# Patient Record
Sex: FEMALE | Race: WHITE | NOT HISPANIC OR LATINO | Employment: UNEMPLOYED | URBAN - METROPOLITAN AREA
[De-identification: names, ages, dates, MRNs, and addresses within clinical notes are randomized per-mention and may not be internally consistent; named-entity substitution may affect disease eponyms.]

---

## 2019-02-19 ENCOUNTER — OFFICE VISIT (OUTPATIENT)
Dept: URGENT CARE | Facility: CLINIC | Age: 49
End: 2019-02-19
Payer: COMMERCIAL

## 2019-02-19 VITALS
DIASTOLIC BLOOD PRESSURE: 66 MMHG | WEIGHT: 270 LBS | HEIGHT: 68 IN | HEART RATE: 87 BPM | SYSTOLIC BLOOD PRESSURE: 146 MMHG | TEMPERATURE: 102 F | RESPIRATION RATE: 19 BRPM | BODY MASS INDEX: 40.92 KG/M2 | OXYGEN SATURATION: 99 %

## 2019-02-19 DIAGNOSIS — J06.9 VIRAL URI: Primary | ICD-10-CM

## 2019-02-19 PROCEDURE — 99203 OFFICE O/P NEW LOW 30 MIN: CPT | Performed by: PHYSICIAN ASSISTANT

## 2019-02-19 RX ORDER — FLUTICASONE PROPIONATE 50 MCG
2 SPRAY, SUSPENSION (ML) NASAL DAILY
Qty: 16 G | Refills: 0 | Status: SHIPPED | OUTPATIENT
Start: 2019-02-19

## 2019-02-19 RX ORDER — BENZONATATE 200 MG/1
200 CAPSULE ORAL 3 TIMES DAILY PRN
Qty: 20 CAPSULE | Refills: 0 | Status: SHIPPED | OUTPATIENT
Start: 2019-02-19

## 2019-02-19 RX ORDER — LAMOTRIGINE 200 MG/1
25 TABLET ORAL DAILY
COMMUNITY

## 2019-02-19 NOTE — PROGRESS NOTES
[facility]        NAME: Pretty Cervantes is a 46 y o  female  : 1970    MRN: [id number]  DATE: 2019  TIME: 11:26 AM    Assessment and Plan   Viral URI [J06 9]  1  Viral URI  benzonatate (TESSALON) 200 MG capsule    fluticasone (FLONASE) 50 mcg/act nasal spray         Patient Instructions     Discussed condition with pt  I suspect acute viral URI which I will treat with hydration, rest, discussed OTC cold meds, will prescribe her Flonase and Tessalon Perles, and observation  Follow up with PCP in 3-5 days  Proceed to  ER if symptoms worsen  Chief Complaint     Chief Complaint   Patient presents with    Fever     Pt reports of fever with sinus congestion and headache with URI s/s          History of Present Illness       Pt presents with onset 2 days ago of HA, nasal congestion, ST, PND, productive cough, chest congestion, fever up to 102 F, chills, body aches  Denies N/V/D  She has taken OTC cold and flu liquid along with Advil every 4 hours  Denies hx of asthma/allergies  Does not smoke (quit 3 months ago)  She has not received the flu shot  Review of Systems   Review of Systems   Constitutional: Positive for chills and fever  HENT: Positive for congestion, postnasal drip and sore throat  Respiratory: Positive for cough  Cardiovascular: Negative  Gastrointestinal: Negative  Genitourinary: Negative  Musculoskeletal: Positive for myalgias           Current Medications       Current Outpatient Medications:     lamoTRIgine (LaMICtal) 200 MG tablet, Take 25 mg by mouth daily, Disp: , Rfl:     lurasidone (LATUDA) 120 mg tablet, Take 20 mg by mouth daily with breakfast, Disp: , Rfl:     PANTOPRAZOLE SODIUM PO, Take 40 mg by mouth, Disp: , Rfl:     benzonatate (TESSALON) 200 MG capsule, Take 1 capsule (200 mg total) by mouth 3 (three) times a day as needed for cough, Disp: 20 capsule, Rfl: 0    fluticasone (FLONASE) 50 mcg/act nasal spray, 2 sprays into each nostril daily, Disp: 16 g, Rfl: 0    Current Allergies     Allergies as of 02/19/2019    (No Known Allergies)            The following portions of the patient's history were reviewed and updated as appropriate: allergies, current medications, past family history, past medical history, past social history, past surgical history and problem list      Past Medical History:   Diagnosis Date    Depression        Past Surgical History:   Procedure Laterality Date    CARDIAC CATHETERIZATION      CHOLECYSTECTOMY      DILATION AND CURETTAGE OF UTERUS      GASTRIC BYPASS         History reviewed  No pertinent family history  Medications have been verified  Objective   /66   Pulse 87   Temp (!) 102 °F (38 9 °C)   Resp 19   Ht 5' 8" (1 727 m)   Wt 122 kg (270 lb)   SpO2 99%   BMI 41 05 kg/m²        Physical Exam     Physical Exam   Constitutional: She is oriented to person, place, and time  She appears well-developed and well-nourished  No distress  HENT:   Right Ear: Hearing, tympanic membrane, external ear and ear canal normal    Left Ear: Hearing, tympanic membrane, external ear and ear canal normal    Nose: Mucosal edema (B/L boggy turbinates) present  Mouth/Throat: Mucous membranes are normal  Posterior oropharyngeal erythema (PND) present  No oropharyngeal exudate  Neck: Neck supple  Cardiovascular: Normal rate, regular rhythm and normal heart sounds  Pulmonary/Chest: Effort normal and breath sounds normal    Lymphadenopathy:     She has no cervical adenopathy  Neurological: She is alert and oriented to person, place, and time  Psychiatric: She has a normal mood and affect  Vitals reviewed

## 2019-02-19 NOTE — PATIENT INSTRUCTIONS

## 2025-05-26 ENCOUNTER — HOSPITAL ENCOUNTER (INPATIENT)
Facility: HOSPITAL | Age: 55
LOS: 1 days | Discharge: HOME | DRG: 641 | End: 2025-05-26
Attending: INTERNAL MEDICINE | Admitting: INTERNAL MEDICINE
Payer: COMMERCIAL

## 2025-05-26 ENCOUNTER — APPOINTMENT (OUTPATIENT)
Dept: RADIOLOGY | Facility: HOSPITAL | Age: 55
DRG: 641 | End: 2025-05-26
Payer: COMMERCIAL

## 2025-05-26 ENCOUNTER — APPOINTMENT (EMERGENCY)
Dept: CT IMAGING | Facility: HOSPITAL | Age: 55
DRG: 641 | End: 2025-05-26
Payer: COMMERCIAL

## 2025-05-26 VITALS
WEIGHT: 190.26 LBS | SYSTOLIC BLOOD PRESSURE: 107 MMHG | HEART RATE: 69 BPM | TEMPERATURE: 97.6 F | DIASTOLIC BLOOD PRESSURE: 56 MMHG | OXYGEN SATURATION: 95 % | RESPIRATION RATE: 16 BRPM

## 2025-05-26 DIAGNOSIS — F10.90 ALCOHOL USE: ICD-10-CM
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
## 2025-05-26 DIAGNOSIS — M54.50 ACUTE MIDLINE LOW BACK PAIN WITHOUT SCIATICA: ICD-10-CM

## 2025-05-26 DIAGNOSIS — R40.2422 GLASGOW COMA SCALE TOTAL SCORE 9-12, AT ARRIVAL TO EMERGENCY DEPARTMENT: ICD-10-CM

## 2025-05-26 DIAGNOSIS — R94.31 PROLONGED QT INTERVAL: ICD-10-CM

## 2025-05-26 PROBLEM — E87.29 ALCOHOLIC KETOACIDOSIS: Status: ACTIVE | Noted: 2025-05-26

## 2025-05-26 PROBLEM — E87.6 HYPOKALEMIA: Status: ACTIVE | Noted: 2025-05-26

## 2025-05-26 LAB
ABO GROUP BLD: NORMAL
ABO GROUP BLD: NORMAL
ALBUMIN SERPL BCG-MCNC: 4 G/DL (ref 3.5–5)
ALP SERPL-CCNC: 95 U/L (ref 34–104)
ALT SERPL W P-5'-P-CCNC: 23 U/L (ref 7–52)
ANION GAP SERPL CALCULATED.3IONS-SCNC: 14 MMOL/L (ref 4–13)
ANION GAP SERPL CALCULATED.3IONS-SCNC: 5 MMOL/L (ref 4–13)
APTT PPP: 26 SECONDS (ref 23–34)
AST SERPL W P-5'-P-CCNC: 16 U/L (ref 13–39)
ATRIAL RATE: 66 BPM
BASE EX.OXY STD BLDV CALC-SCNC: 91 % (ref 60–80)
BASE EXCESS BLDA CALC-SCNC: -5 MMOL/L (ref -2–3)
BASE EXCESS BLDV CALC-SCNC: -1.9 MMOL/L
BASOPHILS # BLD AUTO: 0.07 THOUSANDS/ÂΜL (ref 0–0.1)
BASOPHILS NFR BLD AUTO: 1 % (ref 0–1)
BILIRUB SERPL-MCNC: 0.28 MG/DL (ref 0.2–1)
BLD GP AB SCN SERPL QL: NEGATIVE
BUN SERPL-MCNC: 10 MG/DL (ref 5–25)
BUN SERPL-MCNC: 13 MG/DL (ref 5–25)
CA-I BLD-SCNC: 1.12 MMOL/L (ref 1.12–1.32)
CALCIUM SERPL-MCNC: 7.8 MG/DL (ref 8.4–10.2)
CALCIUM SERPL-MCNC: 8.3 MG/DL (ref 8.4–10.2)
CHLORIDE SERPL-SCNC: 113 MMOL/L (ref 96–108)
CHLORIDE SERPL-SCNC: 99 MMOL/L (ref 96–108)
CK SERPL-CCNC: 78 U/L (ref 26–192)
CO2 SERPL-SCNC: 25 MMOL/L (ref 21–32)
CO2 SERPL-SCNC: 26 MMOL/L (ref 21–32)
CREAT SERPL-MCNC: 0.56 MG/DL (ref 0.6–1.3)
CREAT SERPL-MCNC: 0.64 MG/DL (ref 0.6–1.3)
EOSINOPHIL # BLD AUTO: 0.25 THOUSAND/ÂΜL (ref 0–0.61)
EOSINOPHIL NFR BLD AUTO: 3 % (ref 0–6)
ERYTHROCYTE [DISTWIDTH] IN BLOOD BY AUTOMATED COUNT: 14.6 % (ref 11.6–15.1)
ETHANOL SERPL-MCNC: 203 MG/DL
GFR SERPL CREATININE-BSD FRML MDRD: 103 ML/MIN/1.73SQ M
GFR SERPL CREATININE-BSD FRML MDRD: 99 ML/MIN/1.73SQ M
GLUCOSE SERPL-MCNC: 101 MG/DL (ref 65–140)
GLUCOSE SERPL-MCNC: 79 MG/DL (ref 65–140)
GLUCOSE SERPL-MCNC: 98 MG/DL (ref 65–140)
HCO3 BLDA-SCNC: 21.5 MMOL/L (ref 24–30)
HCO3 BLDV-SCNC: 23.1 MMOL/L (ref 24–30)
HCT VFR BLD AUTO: 45.1 % (ref 34.8–46.1)
HCT VFR BLD CALC: 45 % (ref 34.8–46.1)
HGB BLD-MCNC: 15 G/DL (ref 11.5–15.4)
HGB BLDA-MCNC: 15.3 G/DL (ref 11.5–15.4)
IMM GRANULOCYTES # BLD AUTO: 0.08 THOUSAND/UL (ref 0–0.2)
IMM GRANULOCYTES NFR BLD AUTO: 1 % (ref 0–2)
INR PPP: 0.88 (ref 0.85–1.19)
LACTATE SERPL-SCNC: 1.7 MMOL/L (ref 0.5–2)
LACTATE SERPL-SCNC: 2.6 MMOL/L (ref 0.5–2)
LYMPHOCYTES # BLD AUTO: 3.45 THOUSANDS/ÂΜL (ref 0.6–4.47)
LYMPHOCYTES NFR BLD AUTO: 48 % (ref 14–44)
MCH RBC QN AUTO: 31.8 PG (ref 26.8–34.3)
MCHC RBC AUTO-ENTMCNC: 33.3 G/DL (ref 31.4–37.4)
MCV RBC AUTO: 96 FL (ref 82–98)
MONOCYTES # BLD AUTO: 0.54 THOUSAND/ÂΜL (ref 0.17–1.22)
MONOCYTES NFR BLD AUTO: 7 % (ref 4–12)
NEUTROPHILS # BLD AUTO: 2.91 THOUSANDS/ÂΜL (ref 1.85–7.62)
NEUTS SEG NFR BLD AUTO: 40 % (ref 43–75)
NRBC BLD AUTO-RTO: 0 /100 WBCS
O2 CT BLDV-SCNC: 17.9 ML/DL
P AXIS: 83 DEGREES
PCO2 BLD: 23 MMOL/L (ref 21–32)
PCO2 BLD: 44.2 MM HG (ref 42–50)
PCO2 BLDV: 40.2 MM HG (ref 42–50)
PH BLD: 7.29 [PH] (ref 7.3–7.4)
PH BLDV: 7.38 [PH] (ref 7.3–7.4)
PLATELET # BLD AUTO: 204 THOUSANDS/UL (ref 149–390)
PMV BLD AUTO: 10 FL (ref 8.9–12.7)
PO2 BLD: 52 MM HG (ref 35–45)
PO2 BLDV: 63.7 MM HG (ref 35–45)
POTASSIUM BLD-SCNC: 2.9 MMOL/L (ref 3.5–5.3)
POTASSIUM SERPL-SCNC: 2.9 MMOL/L (ref 3.5–5.3)
POTASSIUM SERPL-SCNC: 3.4 MMOL/L (ref 3.5–5.3)
PR INTERVAL: 156 MS
PROT SERPL-MCNC: 5.7 G/DL (ref 6.4–8.4)
PROTHROMBIN TIME: 12.7 SECONDS (ref 12.3–15)
QRS AXIS: 79 DEGREES
QRSD INTERVAL: 94 MS
QT INTERVAL: 464 MS
QTC INTERVAL: 486 MS
RBC # BLD AUTO: 4.71 MILLION/UL (ref 3.81–5.12)
RH BLD: NEGATIVE
RH BLD: NEGATIVE
SAO2 % BLD FROM PO2: 83 % (ref 60–85)
SODIUM BLD-SCNC: 137 MMOL/L (ref 136–145)
SODIUM SERPL-SCNC: 138 MMOL/L (ref 135–147)
SODIUM SERPL-SCNC: 144 MMOL/L (ref 135–147)
SPECIMEN EXPIRATION DATE: NORMAL
SPECIMEN SOURCE: ABNORMAL
T WAVE AXIS: 79 DEGREES
VENTRICULAR RATE: 66 BPM
WBC # BLD AUTO: 7.3 THOUSAND/UL (ref 4.31–10.16)

## 2025-05-26 PROCEDURE — 83605 ASSAY OF LACTIC ACID: CPT | Performed by: SURGERY

## 2025-05-26 PROCEDURE — 70496 CT ANGIOGRAPHY HEAD: CPT

## 2025-05-26 PROCEDURE — 82803 BLOOD GASES ANY COMBINATION: CPT

## 2025-05-26 PROCEDURE — 85014 HEMATOCRIT: CPT

## 2025-05-26 PROCEDURE — 96365 THER/PROPH/DIAG IV INF INIT: CPT

## 2025-05-26 PROCEDURE — 82947 ASSAY GLUCOSE BLOOD QUANT: CPT

## 2025-05-26 PROCEDURE — 82077 ASSAY SPEC XCP UR&BREATH IA: CPT | Performed by: SURGERY

## 2025-05-26 PROCEDURE — 76705 ECHO EXAM OF ABDOMEN: CPT | Performed by: SURGERY

## 2025-05-26 PROCEDURE — 99285 EMERGENCY DEPT VISIT HI MDM: CPT

## 2025-05-26 PROCEDURE — 80053 COMPREHEN METABOLIC PANEL: CPT | Performed by: SURGERY

## 2025-05-26 PROCEDURE — 85025 COMPLETE CBC W/AUTO DIFF WBC: CPT | Performed by: SURGERY

## 2025-05-26 PROCEDURE — 80048 BASIC METABOLIC PNL TOTAL CA: CPT | Performed by: PHYSICIAN ASSISTANT

## 2025-05-26 PROCEDURE — 96368 THER/DIAG CONCURRENT INF: CPT

## 2025-05-26 PROCEDURE — 70498 CT ANGIOGRAPHY NECK: CPT

## 2025-05-26 PROCEDURE — 36415 COLL VENOUS BLD VENIPUNCTURE: CPT | Performed by: SURGERY

## 2025-05-26 PROCEDURE — 86900 BLOOD TYPING SEROLOGIC ABO: CPT | Performed by: SURGERY

## 2025-05-26 PROCEDURE — 84295 ASSAY OF SERUM SODIUM: CPT

## 2025-05-26 PROCEDURE — 96366 THER/PROPH/DIAG IV INF ADDON: CPT

## 2025-05-26 PROCEDURE — 85610 PROTHROMBIN TIME: CPT | Performed by: SURGERY

## 2025-05-26 PROCEDURE — 96375 TX/PRO/DX INJ NEW DRUG ADDON: CPT

## 2025-05-26 PROCEDURE — 86901 BLOOD TYPING SEROLOGIC RH(D): CPT | Performed by: SURGERY

## 2025-05-26 PROCEDURE — 82550 ASSAY OF CK (CPK): CPT | Performed by: SURGERY

## 2025-05-26 PROCEDURE — 82805 BLOOD GASES W/O2 SATURATION: CPT | Performed by: PHYSICIAN ASSISTANT

## 2025-05-26 PROCEDURE — 93308 TTE F-UP OR LMTD: CPT | Performed by: SURGERY

## 2025-05-26 PROCEDURE — 99223 1ST HOSP IP/OBS HIGH 75: CPT | Performed by: SURGERY

## 2025-05-26 PROCEDURE — 85730 THROMBOPLASTIN TIME PARTIAL: CPT | Performed by: SURGERY

## 2025-05-26 PROCEDURE — 71260 CT THORAX DX C+: CPT

## 2025-05-26 PROCEDURE — 86850 RBC ANTIBODY SCREEN: CPT | Performed by: SURGERY

## 2025-05-26 PROCEDURE — 84132 ASSAY OF SERUM POTASSIUM: CPT

## 2025-05-26 PROCEDURE — 82330 ASSAY OF CALCIUM: CPT

## 2025-05-26 PROCEDURE — 93010 ELECTROCARDIOGRAM REPORT: CPT | Performed by: INTERNAL MEDICINE

## 2025-05-26 PROCEDURE — 74177 CT ABD & PELVIS W/CONTRAST: CPT

## 2025-05-26 PROCEDURE — 71045 X-RAY EXAM CHEST 1 VIEW: CPT

## 2025-05-26 PROCEDURE — 93005 ELECTROCARDIOGRAM TRACING: CPT

## 2025-05-26 PROCEDURE — EDAIR PR ED AIR: Performed by: EMERGENCY MEDICINE

## 2025-05-26 RX ORDER — LIDOCAINE 50 MG/G
2 PATCH TOPICAL ONCE
Status: DISCONTINUED | OUTPATIENT
Start: 2025-05-26 | End: 2025-05-26 | Stop reason: HOSPADM

## 2025-05-26 RX ORDER — ACETAMINOPHEN 325 MG/1
975 TABLET ORAL ONCE
Status: DISCONTINUED | OUTPATIENT
Start: 2025-05-26 | End: 2025-05-26

## 2025-05-26 RX ORDER — LANOLIN ALCOHOL/MO/W.PET/CERES
100 CREAM (GRAM) TOPICAL DAILY
Status: DISCONTINUED | OUTPATIENT
Start: 2025-05-26 | End: 2025-05-26 | Stop reason: HOSPADM

## 2025-05-26 RX ORDER — FOLIC ACID 1 MG/1
1 TABLET ORAL DAILY
Status: DISCONTINUED | OUTPATIENT
Start: 2025-05-26 | End: 2025-05-26 | Stop reason: HOSPADM

## 2025-05-26 RX ORDER — ACETAMINOPHEN 325 MG/1
975 TABLET ORAL EVERY 8 HOURS
Status: DISCONTINUED | OUTPATIENT
Start: 2025-05-26 | End: 2025-05-26 | Stop reason: HOSPADM

## 2025-05-26 RX ORDER — MAGNESIUM SULFATE HEPTAHYDRATE 40 MG/ML
2 INJECTION, SOLUTION INTRAVENOUS ONCE
Status: COMPLETED | OUTPATIENT
Start: 2025-05-26 | End: 2025-05-26

## 2025-05-26 RX ORDER — ACETAMINOPHEN 10 MG/ML
1000 INJECTION, SOLUTION INTRAVENOUS ONCE
Status: COMPLETED | OUTPATIENT
Start: 2025-05-26 | End: 2025-05-26

## 2025-05-26 RX ORDER — ACETAMINOPHEN 325 MG/1
650 TABLET ORAL EVERY 6 HOURS PRN
Start: 2025-05-26

## 2025-05-26 RX ORDER — POTASSIUM CHLORIDE 14.9 MG/ML
20 INJECTION INTRAVENOUS ONCE
Status: COMPLETED | OUTPATIENT
Start: 2025-05-26 | End: 2025-05-26

## 2025-05-26 RX ADMIN — Medication 100 MG: at 09:41

## 2025-05-26 RX ADMIN — SODIUM CHLORIDE, SODIUM LACTATE, POTASSIUM CHLORIDE, AND CALCIUM CHLORIDE 1000 ML: .6; .31; .03; .02 INJECTION, SOLUTION INTRAVENOUS at 08:05

## 2025-05-26 RX ADMIN — MULTIPLE VITAMINS W/ MINERALS TAB 1 TABLET: TAB ORAL at 09:41

## 2025-05-26 RX ADMIN — FOLIC ACID 1 MG: 1 TABLET ORAL at 09:41

## 2025-05-26 RX ADMIN — ACETAMINOPHEN 1000 MG: 1000 INJECTION, SOLUTION INTRAVENOUS at 07:01

## 2025-05-26 RX ADMIN — IOHEXOL 100 ML: 350 INJECTION, SOLUTION INTRAVENOUS at 05:21

## 2025-05-26 RX ADMIN — POTASSIUM CHLORIDE 20 MEQ: 14.9 INJECTION, SOLUTION INTRAVENOUS at 07:01

## 2025-05-26 RX ADMIN — LIDOCAINE 2 PATCH: 700 PATCH TOPICAL at 07:01

## 2025-05-26 RX ADMIN — MAGNESIUM SULFATE HEPTAHYDRATE 2 G: 40 INJECTION, SOLUTION INTRAVENOUS at 07:01

## 2025-05-26 RX ADMIN — SODIUM CHLORIDE 1000 ML: 0.9 INJECTION, SOLUTION INTRAVENOUS at 07:02

## 2025-05-26 RX ADMIN — ACETAMINOPHEN 975 MG: 325 TABLET ORAL at 14:02

## 2025-05-26 NOTE — H&P
H&P - Trauma   Name: Alethea Trevino 55 y.o. female I MRN: [id number]  Unit/Bed#: ED-23 I Date of Admission: 5/26/2025   Date of Service: 5/26/2025 I Hospital Day: 0     Assessment & Plan  Fall, initial encounter  -No acute findings on imaging  -PT/OT  -Multimodal pain management.   Dresden coma scale total score 9-12, at arrival to emergency department  -CTH, neg  -CTA H/N neg  -Patient GCS 15 on reevaluation in the room and clinical clearance of C-spine.   -ETOH 200s, possible alcoholic ketoacidosis  -Replete electrolytes  -Trend lactic  -IVF bolus    Acute midline low back pain without sciatica  -No acute findings on CTAP  -Multimodal pain management  -PT/OT    Prolonged QT interval  -Hx of poor diet  -Hx of alcohol use  -Replete electrolytes and repeat EKG  Alcohol use  -Hx of gastric bypass  -Hx of poor diet  -Thiamine, folate, multivitamin  -CIWA precautions.     Trauma Alert: Level A   Model of Arrival: Ambulance    Trauma Team: Attending Dr. Beaver and Residents Dr. Plascencia  Consultants:     None     History of Present Illness   Chief Complaint: Fall w/ AMS  Mechanism:Fall     Alethea Trevino is a 55 y.o. female who presents Fall.    Per EMS patient was going up 1 step and fell backwards onto her buttocks and then from completed the fall and hit her head.  Unknown LOC.  No blood thinner use.  Possible alcohol use.  GCS 12 per EMS.  Patient not answering questions in the emergency department following commands and endorses back pain.      Supporting history from daughter-in-law, Lexus Haynes [phone].  Patient is visiting from [place].  She has a previous history of gastric bypass and possibly bipolar disorder.  She has noted that she has been eating less than normal, will eat only yogurt and cottage cheese in approximately twice a day.  History of gastric bypass.  Has been drinking EtOH frequently.    Review of Systems   All other systems reviewed and are negative.    Medical History Review: I have  reviewed the patient's PMH, PSH, Social History, Family History, Meds, and Allergies   Patient has no allergy information on record.    There is no immunization history on file for this patient.  Last Tetanus: unknown         Objective :  Temp:  [97.6 °F (36.4 °C)] 97.6 °F (36.4 °C)  HR:  [63-69] 64  BP: ()/(48-59) 90/53  Resp:  [14-18] 14  SpO2:  [96 %-100 %] 99 %  O2 Device: Nasal cannula    Initial Vitals:   Temperature: 97.6 °F (36.4 °C) (05/26/25 0505)  Pulse: 64 (05/26/25 0505)  Respirations: 16 (05/26/25 0505)  Blood Pressure: 106/59 (05/26/25 0505)    Primary Survey:   Airway:        Status: patent;        Pre-hospital Interventions: none        Hospital Interventions: none  Breathing:        Pre-hospital Interventions: none       Effort: normal       Right breath sounds: normal       Left breath sounds: normal  Circulation:        Rhythm: regular no murmur       Rate: regular   Right Pulses Left Pulses    R radial: 2+  R femoral: 2+  R pedal: 2+  R carotid: 2+   L radial: 2+  L femoral: 2+  L pedal: 2+  L carotid: 2+     Disability:        GCS: Eye: 3; Verbal: 1 Motor: 6 Total: 10       Right Pupil: 4 mm;  round;  reactive         Left Pupil:  4 mm;  round;  reactive      R Motor Strength L Motor Strength    R : 4/5  R dorsiflex: 4/5  R plantarflex: 4/5 L : 4/5  L dorsiflex: 4/5  L plantarflex: 4/5        Sensory:  No sensory deficit  Exposure:       Completed: Yes      Secondary Survey:  Physical Exam  Vitals reviewed.   Constitutional:       General: She is in acute distress.      Appearance: Normal appearance. She is obese. She is not ill-appearing, toxic-appearing or diaphoretic.   HENT:      Head: Normocephalic and atraumatic.      Right Ear: External ear normal.      Left Ear: External ear normal.      Nose: Nose normal.     Eyes:      Extraocular Movements: Extraocular movements intact.      Pupils: Pupils are equal, round, and reactive to light.     Neck:      Comments: Midline T and  L-spine tenderness without step-off or deformity.  Cardiovascular:      Rate and Rhythm: Normal rate and regular rhythm.      Pulses: Normal pulses.      Heart sounds: Normal heart sounds. No murmur heard.  Pulmonary:      Effort: Pulmonary effort is normal. No respiratory distress.      Breath sounds: Normal breath sounds. No stridor. No wheezing, rhonchi or rales.   Chest:      Chest wall: No tenderness.   Abdominal:      General: There is no distension.      Palpations: Abdomen is soft. There is no mass.      Tenderness: There is no abdominal tenderness. There is no guarding or rebound.      Hernia: No hernia is present.     Musculoskeletal:         General: No swelling, tenderness, deformity or signs of injury.      Cervical back: Neck supple. No rigidity or tenderness.      Right lower leg: Edema present.      Left lower leg: Edema present.     Skin:     General: Skin is warm and dry.      Capillary Refill: Capillary refill takes less than 2 seconds.      Coloration: Skin is not jaundiced or pale.      Findings: No bruising, erythema, lesion or rash.     Neurological:      Mental Status: She is alert.      GCS: GCS eye subscore is 3. GCS verbal subscore is 1. GCS motor subscore is 6.             Lab Results: I have reviewed the following results:  Recent Labs     05/26/25  0508 05/26/25  0510 05/26/25  0802   WBC 7.30  --   --    HGB 15.0 15.3  --    HCT 45.1 45  --      --   --    SODIUM 138  --   --    K 2.9*  --   --    CL 99  --   --    CO2 25 23  --    BUN 13  --   --    CREATININE 0.64  --   --    GLUC 101  --   --    CAIONIZED  --  1.12  --    AST 16  --   --    ALT 23  --   --    ALB 4.0  --   --    TBILI 0.28  --   --    ALKPHOS 95  --   --    PTT 26  --   --    INR 0.88  --   --    LACTICACID 2.6*  --  1.7       Imaging Results: I have personally reviewed pertinent images saved in PACS. CT scan findings (and other pertinent positive findings on images) were discussed with radiology. My  interpretation of the images/reports are as follows:  Chest Xray(s): negative for acute findings   FAST exam(s): negative for acute findings   CT Scan(s): negative for acute findings   Additional Xray(s): N/A     Other Studies: EKG normal sinus rhythm rate of 66, normal TN interval, normal QRS interval, QTc 486, normal axis, nonspecific ST changes with T wave flattening in 3 and aVF, V2, and T wave inversions in V1 and V3, no ischemic changes or STEMI, no delta waves, no epsilon wave, no Brugada pattern, mild QT prolongation.

## 2025-05-26 NOTE — Clinical Note
Case was discussed with Dr. Canada and the patient's admission status was agreed to be Admission Status: inpatient status to the service of Dr. Canada .

## 2025-05-26 NOTE — ED NOTES
Patient continuously asking to be discharged. Asked to not be moved. Provider aware.      Kellie Pérez RN  05/26/25 1343

## 2025-05-26 NOTE — TREATMENT PLAN
Patient seen multiple times throughout the afternoon.  After initially agreeing to admission to the medical service for further resuscitation and workup/treatment as indicated, the patient ultimately decided that she did not want to be admitted and requested to be discharged.  At the time of her initial request, she had additional workup pending and was not deemed medically appropriate for discharge.  We did discuss pursuing additional workup for potential medically appropriate discharge versus signing out AMA, and the patient elected to pursue the additional workup while receiving additional resuscitation.    Her follow-up lab work, ambulation trial, and trial off of supplemental oxygen were completed.  Her repeat lab work demonstrated notable improvement.  She successfully ambulated without difficulty and did not have any new complaints of pain or concerns for undiagnosed injuries at this time.  She remained off of supplemental oxygen for greater than 1 hour with no evidence of hypoxia, shortness of breath or difficulty breathing.    Further discussion was had with the patient as her blood pressure still remained on the lower side, but she was able to maintain a MAP greater than 65 without ongoing IV fluid hydration and did not have any symptoms of hypotension.    I reviewed the additional workup with the internal medicine service and together we agreed that the patient would not need to sign out AMA, but that she is appropriate for discharge per her request.    Strict return precautions were discussed with the patient and provided to her on discharge.

## 2025-05-26 NOTE — PROCEDURES
POC FAST US    Date/Time: 5/26/2025 5:36 AM    Performed by: Verona Plascencia MD  Authorized by: Verona Plascencia MD    Patient location:  ED and Trauma  Other Assisting Provider: No    Procedure details:     Exam Type:  Diagnostic    Indications comment:  Fall w/ AMS    Assess for:  Intra-abdominal fluid and pericardial effusion    Technique: FAST      Views obtained:  Heart - Pericardial sac, RUQ - Ayoub's Pouch, LUQ - Splenorenal space and Suprapubic - Pouch of Keith    Image quality: diagnostic      Image availability:  Images available in PACS  FAST Findings:     RUQ (Hepatorenal) free fluid: absent      LUQ (Splenorenal) free fluid: absent      Suprapubic free fluid: absent      Cardiac wall motion: identified      Pericardial effusion: absent    Interpretation:     Impressions: negative

## 2025-05-26 NOTE — QUICK NOTE
Spoke with patient and explained to her that she will be admitted to the hospital for her acute alcoholic ketoacidosis. She declined admission at the time and stated that she had a few days left with her grandkids and she would not spend today in the hospital. Patient was A&Ox4 and competent to make that decision. Spoke with the ED provider and recommended to repeat blood tests (specifically potassium and VBG) before considering her for discharge vs AMA. SLIM attending and ED provider agreed with this decision-making. Will not be admitting at current time.

## 2025-05-26 NOTE — UTILIZATION REVIEW
Initial Clinical Review    Admission: Date/Time/Statement:   Admission Orders (From admission, onward)       Ordered        05/26/25 0900  INPATIENT ADMISSION  Once                          Orders Placed This Encounter   Procedures    INPATIENT ADMISSION     Standing Status:   Standing     Number of Occurrences:   1     Level of Care:   Level 2 Stepdown / HOT [14]     Estimated length of stay:   Not Applicable     ED Arrival Information       Expected   -    Arrival   5/26/2025 05:00    Acuity   Emergent              Means of arrival   Ambulance    Escorted by   [facility]    Service   Hospitalist    Admission type   Emergency              Arrival complaint   EMS_ Trauma             Chief Complaint   Patient presents with    Trauma     Fall down 1 step +HS +ETOH. GCS 12 for medics trauma A       Initial Presentation: 55 y.o. female with hx gastric bypass and possibly bipolar disorder  who presents to ED via EMS from family member's home with fall- was going up step and fell backwards onto her buttocks and head strike .  Unknown LOC. No blood thinner use. Possible alcohol use. GCS 12 per EMS. Per dgt in law ,she has been eating less than normal, will eat only yogurt and cottage cheese in approximately twice a day. On exam, GCS 10 , 4/5 motor strength Bilat  and plantar flexion . Midline T and L-spine tenderness . Re eval GCS 15  . Labs: ETOH 200's , lactic acid 2.6 . K 2.9 . EKG normal sinus rhythm rate of 66, normal QRS  . CT head, CT Head/ neck neg . Pt given IV Tylenol, K repletion, IVF in ED. Pt admitted as Inpatient with fall, midline back pain , alcohol use, possible ketoacidosis .Multimodal pain control. PT/OT . Replete lytes . Trend lactic acid . IVF bolus . CIWA . Thiamine, folate, MVI .    Pt was to be admitted by internal med for acute alcoholic ketoacidosis. She declined admission at the time and stated that she had a few days left with her grandkids and she would not spend today in the  Hospitals in [place]. Patient was A&Ox4 and competent to make that decision. Spoke with the ED provider and recommended to repeat blood tests (specifically potassium and VBG) before considering her for discharge vs AMA.           ED Treatment-Medication Administration from 05/26/2025 0446 to 05/26/2025 1812         Date/Time Order Dose Route Action     05/26/2025 0521 iohexol (OMNIPAQUE) 350 MG/ML injection (MULTI-DOSE) 100 mL 100 mL Intravenous Given     05/26/2025 0701 lidocaine (LIDODERM) 5 % patch 2 patch 2 patch Topical Medication Applied     05/26/2025 0701 acetaminophen (Ofirmev) injection 1,000 mg 1,000 mg Intravenous New Bag     05/26/2025 0701 potassium chloride 20 mEq IVPB (premix) 20 mEq Intravenous New Bag     05/26/2025 0701 magnesium sulfate 2 g/50 mL IVPB (premix) 2 g 2 g Intravenous New Bag     05/26/2025 0702 sodium chloride 0.9 % bolus 1,000 mL 1,000 mL Intravenous New Bag     05/26/2025 0941 thiamine tablet 100 mg 100 mg Oral Given     05/26/2025 0941 folic acid (FOLVITE) tablet 1 mg 1 mg Oral Given     05/26/2025 0941 multivitamin-minerals (CENTRUM) tablet 1 tablet 1 tablet Oral Given     05/26/2025 0805 lactated ringers bolus 1,000 mL 1,000 mL Intravenous New Bag     05/26/2025 1402 acetaminophen (TYLENOL) tablet 975 mg 975 mg Oral Given            Scheduled Medications:  acetaminophen, 975 mg, Oral, Q8H  folic acid, 1 mg, Oral, Daily  lidocaine, 2 patch, Topical, Once  multivitamin-minerals, 1 tablet, Oral, Daily  thiamine, 100 mg, Oral, Daily      Continuous IV Infusions:     PRN Meds:     ED Triage Vitals   Temperature Pulse Respirations Blood Pressure SpO2 Pain Score   05/26/25 0505 05/26/25 0505 05/26/25 0505 05/26/25 0505 05/26/25 0505 05/26/25 1402   97.6 °F (36.4 °C) 64 16 106/59 100 % 8     Weight (last 2 days) before discharge       Date/Time Weight    05/26/25 05:05:14 86.3 (190.26)            Vital Signs (last 3 days) before discharge       Date/Time Temp Pulse Resp BP MAP (mmHg) SpO2  Calculated FIO2 (%) - Nasal Cannula Nasal Cannula O2 Flow Rate (L/min) O2 Device Patient Position - Orthostatic VS Sarah Coma Scale Score CIWA-Ar Total Pain    05/26/25 1545 -- 69 16 107/56 78 95 % -- -- None (Room air) Sitting -- -- --    05/26/25 1530 -- 72 16 108/56 79 96 % -- -- None (Room air) Sitting -- -- --    05/26/25 1515 -- 68 16 93/50 67 97 % -- -- None (Room air) Sitting -- -- --    05/26/25 1500 -- 71 16 97/51 70 95 % -- -- None (Room air) Lying -- -- --    05/26/25 1445 -- 77 16 94/51 69 95 % -- -- None (Room air) Sitting -- -- --    05/26/25 1430 -- 74 16 103/56 75 96 % -- -- None (Room air) Sitting -- -- --    05/26/25 1415 -- 75 16 91/53 67 96 % -- -- None (Room air) Sitting -- -- --    05/26/25 1402 -- -- -- -- -- -- -- -- -- -- -- -- 8    05/26/25 1400 -- 72 16 101/57 76 96 % -- -- None (Room air) Sitting -- -- --    05/26/25 1345 -- 74 16 83/43 60 96 % -- -- None (Room air) Sitting -- -- --    05/26/25 1340 -- 75 16 88/46 63 95 % -- -- None (Room air) Lying -- -- --    05/26/25 1300 -- 75 16 93/55 70 95 % -- -- None (Room air) Lying -- -- --    05/26/25 1245 -- 71 16 97/56 73 98 % -- -- None (Room air) Lying -- -- --    05/26/25 1230 -- 70 16 98/56 73 97 % -- -- None (Room air) Lying -- -- --    05/26/25 1215 -- 70 16 99/57 75 95 % -- -- None (Room air) Lying -- -- --    05/26/25 1200 -- 69 16 102/57 76 96 % -- -- None (Room air) Lying -- -- --    05/26/25 1145 -- 69 14 94/56 67 96 % 28 2 L/min Nasal cannula Lying -- -- --    05/26/25 1130 -- 68 14 94/56 70 96 % 28 2 L/min Nasal cannula Lying -- 2 --    05/26/25 1100 -- 72 14 98/56 74 96 % 28 2 L/min Nasal cannula Sitting -- -- --    05/26/25 1045 -- 73 14 96/52 67 97 % 28 2 L/min Nasal cannula Sitting -- -- --    05/26/25 1030 -- 78 14 93/55 68 96 % 28 2 L/min Nasal cannula -- -- -- --    05/26/25 1000 -- 70 14 101/56 75 98 % -- -- -- -- -- -- --    05/26/25 0945 -- 66 16 99/55 71 99 % 28 2 L/min Nasal cannula -- -- -- --    05/26/25 0930 --  64 14 103/60 -- 99 % -- -- None (Room air) -- 14 -- --    05/26/25 0900 -- 68 14 90/48 -- 98 % -- -- Nasal cannula -- 13 -- --    05/26/25 0855 -- 72 -- -- -- 96 % -- -- -- -- -- -- --    05/26/25 0850 -- 68 -- -- -- 92 % -- -- -- -- -- -- --    05/26/25 0845 -- 69 -- -- -- 96 % -- -- -- -- -- -- --    05/26/25 0840 -- 68 -- 85/49 -- 93 % -- -- -- -- -- -- --    05/26/25 0835 -- 67 -- -- -- 95 % -- -- -- -- -- -- --    05/26/25 0830 -- 68 14 87/50 -- 95 % -- -- Nasal cannula -- 13 -- --    05/26/25 0825 -- 68 -- -- -- 95 % -- -- -- -- -- -- --    05/26/25 0820 -- 68 -- 88/50 -- 92 % -- -- -- -- -- -- --    05/26/25 0815 -- 64 14 90/53 -- 99 % -- -- Nasal cannula -- 13 -- --    05/26/25 0810 -- 65 -- -- -- 98 % -- -- -- -- -- -- --    05/26/25 0805 -- 66 -- -- -- 99 % -- -- -- -- -- -- --    05/26/25 0800 -- 66 14 89/50 -- 99 % -- -- Nasal cannula -- 13 -- --    05/26/25 0755 -- 66 -- -- -- 99 % -- -- -- -- -- -- --    05/26/25 0750 -- 64 -- 80/46 -- 98 % -- -- -- -- -- -- --    05/26/25 0745 -- 64 14 86/50 -- 100 % -- -- Nasal cannula -- 13 -- --    05/26/25 0740 -- 65 -- -- -- 95 % -- -- -- -- -- -- --    05/26/25 0735 -- 66 -- -- -- 97 % -- -- -- -- -- -- --    05/26/25 0730 -- 65 -- -- -- 94 % -- -- -- -- -- -- --    05/26/25 0725 -- 66 -- -- -- 100 % -- -- -- -- -- -- --    05/26/25 0720 -- 67 -- -- -- 99 % -- -- -- -- -- -- --    05/26/25 0715 -- 67 -- -- -- 100 % -- -- -- -- -- -- --    05/26/25 0710 -- 66 -- -- -- 100 % -- -- -- -- -- -- --    05/26/25 0705 -- 66 -- -- -- 99 % -- -- -- -- -- -- --    05/26/25 0700 -- 69 18 105/52 -- 96 % -- -- None (Room air) -- 14 -- --    05/26/25 0655 -- 69 -- -- -- 95 % -- -- -- -- -- -- --    05/26/25 0650 -- 68 -- -- -- 97 % -- -- -- -- -- -- --    05/26/25 0645 -- 65 -- 97/55 -- 100 % -- -- -- -- -- -- --    05/26/25 0640 -- 67 -- -- -- 91 % -- -- -- -- -- -- --    05/26/25 0635 -- 67 -- -- -- 96 % -- -- -- -- -- -- --    05/26/25 0630 -- 68 18 109/53 -- 96 % -- --  None (Room air) -- 13 -- --    05/26/25 0625 -- 68 -- -- -- 99 % -- -- -- -- -- -- --    05/26/25 0620 -- 67 -- 111/53 -- 98 % -- -- -- -- -- -- --    05/26/25 0615 -- 67 -- 110/57 -- 99 % -- -- -- -- -- -- --    05/26/25 0610 -- 69 -- 113/60 -- 98 % -- -- -- -- -- -- --    05/26/25 0605 -- 69 -- 91/51 -- 96 % -- -- -- -- -- -- --    05/26/25 0600 -- 68 18 99/52 -- 96 % -- -- None (Room air) -- 13 -- --    05/26/25 0555 -- 67 -- 94/55 -- 95 % -- -- -- -- -- -- --    05/26/25 0550 -- 66 -- 95/54 -- 95 % -- -- -- -- -- -- --    05/26/25 0545 -- 67 16 104/54 -- 99 % -- -- None (Room air) Lying 13 -- --    05/26/25 0540 -- 66 -- -- -- 96 % -- -- -- -- -- -- --    05/26/25 0535 -- 66 -- 102/58 -- 98 % -- -- -- -- -- -- --    05/26/25 0530 -- 67 16 99/55 -- 97 % -- -- None (Room air) -- 11 -- --    05/26/25 0515 -- 63 16 93/48 -- 99 % -- -- -- -- 11 -- --    05/26/25 0510 -- 63 -- 106/59 -- 99 % -- -- -- -- -- -- --    05/26/25 05:05:14 -- 64 16 -- -- 100 % -- -- None (Room air) -- -- -- --    05/26/25 0505 97.6 °F (36.4 °C) 64 -- 106/59 -- 100 % -- -- -- -- -- -- --    05/26/25 05:01:35 -- -- -- -- -- -- -- -- -- -- 11 -- --           CIWA-Ar Score       Row Name 05/26/25 1130             CIWA-Ar    BP 94/56      Nausea and Vomiting 0      Tactile Disturbances 0      Tremor 0      Auditory Disturbances 0      Paroxysmal Sweats 0      Visual Disturbances 0      Anxiety 0      Headache, Fullness in Head 2      Agitation 0      Orientation and Clouding of Sensorium 0      CIWA-Ar Total 2                      Pertinent Labs/Diagnostic Test Results:   Radiology:  TRAUMA - CT chest abdomen pelvis w contrast   Final Interpretation by Lexa Vogt DO (05/26 0631)      No evidence of acute traumatic injury in the chest, abdomen, or pelvis.      Cardiomegaly, status post gastric bypass, and other findings as above.         I personally discussed this study with Dr. Beaver on 5/26/2025 at 5:58 AM.      Workstation  performed: [id number]         CTA head and neck w wo contrast   Final Interpretation by Lexa Vogt DO (05/26 0630)      Small posterior scalp hematomas but no calvarial fracture or acute intracranial process is seen.      No occlusion, severe stenosis or aneurysm of the major arteries of the head and neck.      No significant stenosis within either internal carotid artery.      Patent bilateral vertebral arteries.      Degenerative changes of the cervical spine as described no evidence of acute cervical spine injury.      Other findings as above.            I personally discussed this study with Dr. Beaver on 5/26/2025 at 5:58 AM.            Workstation performed: [id number]         CT recon only cervical spine (No Charge)   Final Interpretation by Lexa Vogt DO (05/26 0630)      Small posterior scalp hematomas but no calvarial fracture or acute intracranial process is seen.      No occlusion, severe stenosis or aneurysm of the major arteries of the head and neck.      No significant stenosis within either internal carotid artery.      Patent bilateral vertebral arteries.      Degenerative changes of the cervical spine as described no evidence of acute cervical spine injury.      Other findings as above.            I personally discussed this study with Dr. Beaver on 5/26/2025 at 5:58 AM.            Workstation performed: [id number]         XR Trauma multiple (SLB/SLRA trauma bay ONLY)   Final Interpretation by Lexa Vogt DO (05/26 0639)      Cardiac silhouette appears enlarged, no acute cardiopulmonary disease within limitations of supine imaging.      Other findings as above. Correlation with pending trauma CTs recommended.                  Workstation performed: [id number]         XR chest 1 view   Final Interpretation by Lexa Vogt DO (05/26 0639)      Cardiac silhouette appears enlarged, no acute cardiopulmonary disease within limitations of supine  "imaging.      Other findings as above. Correlation with pending trauma CTs recommended.                  Workstation performed: [id number]           Cardiology:  ECG 12 lead   Final Result by William Jones MD (05/26 7222)   Normal sinus rhythm   Right atrial enlargement   Nonspecific ST and T wave abnormality   Prolonged QT   Abnormal ECG   No previous ECGs available   Confirmed by William Jones ([id number]) on 5/26/2025 4:02:54 PM        GI:  No orders to display           Results from last 7 days   Lab Units 05/26/25  0510 05/26/25  0508   WBC Thousand/uL  --  7.30   HEMOGLOBIN g/dL  --  15.0   I STAT HEMOGLOBIN g/dl 15.3  --    HEMATOCRIT %  --  45.1   HEMATOCRIT, ISTAT % 45  --    PLATELETS Thousands/uL  --  204   TOTAL NEUT ABS Thousands/µL  --  2.91         Results from last 7 days   Lab Units 05/26/25  1223 05/26/25  0510 05/26/25  0508   SODIUM mmol/L 144  --  138   POTASSIUM mmol/L 3.4*  --  2.9*   CHLORIDE mmol/L 113*  --  99   CO2 mmol/L 26  --  25   CO2, I-STAT mmol/L  --  23  --    ANION GAP mmol/L 5  --  14*   BUN mg/dL 10  --  13   CREATININE mg/dL 0.56*  --  0.64   EGFR ml/min/1.73sq m 103  --  99   CALCIUM mg/dL 7.8*  --  8.3*   CALCIUM, IONIZED, ISTAT mmol/L  --  1.12  --      Results from last 7 days   Lab Units 05/26/25  0508   AST U/L 16   ALT U/L 23   ALK PHOS U/L 95   TOTAL PROTEIN g/dL 5.7*   ALBUMIN g/dL 4.0   TOTAL BILIRUBIN mg/dL 0.28         Results from last 7 days   Lab Units 05/26/25  1223 05/26/25  0508   GLUCOSE RANDOM mg/dL 79 101             No results found for: \"BETA-HYDROXYBUTYRATE\"       Results from last 7 days   Lab Units 05/26/25  1223   PH RYAN  7.377   PCO2 RYAN mm Hg 40.2*   PO2 RYAN mm Hg 63.7*   HCO3 RYAN mmol/L 23.1*   BASE EXC RYAN mmol/L -1.9   O2 CONTENT RYAN ml/dL 17.9   O2 HGB, VENOUS % 91.0*     Results from last 7 days   Lab Units 05/26/25  0510   PH, RYAN I-STAT  7.295*   PCO2, RYAN ISTAT mm HG 44.2   PO2, RYAN ISTAT mm HG 52.0*   HCO3, RYAN ISTAT mmol/L 21.5*   I STAT " BASE EXC mmol/L -5*   I STAT O2 SAT % 83     Results from last 7 days   Lab Units 05/26/25  0508   CK TOTAL U/L 78             Results from last 7 days   Lab Units 05/26/25  0508   PROTIME seconds 12.7   INR  0.88   PTT seconds 26             Results from last 7 days   Lab Units 05/26/25  0802 05/26/25  0508   LACTIC ACID mmol/L 1.7 2.6*                   Results from last 7 days   Lab Units 05/26/25  0508   ETHANOL LVL mg/dL 203*             Past Medical History[1]  Present on Admission:  **None**      Admitting Diagnosis: Multiple injuries due to trauma [T07.XXXA]  Age/Sex: 55 y.o. female    Network Utilization Review Department  ATTENTION: Please call with any questions or concerns to [phone] and carefully listen to the prompts so that you are directed to the right person. All voicemails are confidential.   For Discharge needs, contact Care Management DC Support Team at [phone] opt. 2  Send all requests for admission clinical reviews, approved or denied determinations and any other requests to dedicated fax number below belonging to the campus where the patient is receiving treatment. List of dedicated fax numbers for the Facilities:  FACILITY NAME UR FAX NUMBER   ADMISSION DENIALS (Administrative/Medical Necessity) [phone]   DISCHARGE SUPPORT TEAM (NETWORK) [phone]   PARENT CHILD HEALTH (Maternity/NICU/Pediatrics) [phone]   [facility] [phone]   [facility] [phone]   [facility] [phone]   [facility] [phone]   [facility] [phone]   [facility] [phone]   [facility] [phone]   [facility] [phone]   [facility] [phone]   [facility] [phone]   [facility]  [place] [phone]   Critical access hospital [facility] [place] [phone]               [1] No past medical history on file.

## 2025-05-26 NOTE — ED PROVIDER NOTES
Emergency Department Airway Evaluation and Management Form    History  Obtained from: EMS  Patient has no allergy information on record.  No chief complaint on file.    55-year-old female presents via EMS reporting that she had fallen and was found with a decreased level of consciousness, they report that she was a GCS of 12, smell of EtOH, history and review of systems are severely limited by the patient's altered mental status.  They report that she had fallen down 1 stair onto her bottom and then fell back and struck her head.        Past Medical History[1]  Past Surgical History[2]  Family History[3]  Social History[4]  I have reviewed and agree with the history as documented.    Review of Systems   Unable to perform ROS: Mental status change       Physical Exam  /59   Pulse 64   Temp 97.6 °F (36.4 °C)   Resp 16   Wt 86.3 kg (190 lb 4.1 oz)   SpO2 100%     Physical Exam  Vitals and nursing note reviewed.   Constitutional:       General: She is not in acute distress.     Appearance: She is well-developed. She is obese.      Comments: Smell of EtOH   HENT:      Head: Normocephalic and atraumatic.      Mouth/Throat:      Comments: Dry mucous membranes, but airway is intact    Eyes:      Extraocular Movements: Extraocular movements intact.      Conjunctiva/sclera: Conjunctivae normal.       Cardiovascular:      Rate and Rhythm: Normal rate and regular rhythm.   Pulmonary:      Effort: Pulmonary effort is normal. No respiratory distress.      Breath sounds: Normal breath sounds.   Abdominal:      General: There is no distension.      Palpations: Abdomen is soft.      Tenderness: There is no abdominal tenderness.     Musculoskeletal:         General: No swelling.      Cervical back: Normal range of motion.     Skin:     General: Skin is warm and dry.      Capillary Refill: Capillary refill takes less than 2 seconds.     Neurological:      General: No focal deficit present.      Comments: Patient is making  incomprehensible sounds on painful stimulus, is opening eyes spontaneously, following commands, GCS is 10   Psychiatric:         Mood and Affect: Mood normal.         ED Medications  Medications - No data to display    Intubation  Procedures    Notes  The patient is protecting the airway adequately, does not need any airway intervention at this time.  The remainder of the patient's care will be deferred to the trauma team.      Final Diagnosis  Final diagnoses:   None       ED Provider  Electronically Signed by       [1] No past medical history on file.  [2] No past surgical history on file.  [3] No family history on file.  [4]         Alf Tam MD  05/26/25 8972

## 2025-05-26 NOTE — DISCHARGE INSTRUCTIONS
Discharge Instructions:    Please follow-up as instructed. If you need a follow-up appointment, please call the office when you leave to schedule an appointment.    Activity:  - You may resume activity as tolerated.    Diet:    - You may resume your normal diet.    Medications:  - You should continue your current medication regimen after discharge unless otherwise instructed. Please refer to your discharge medication list for further details.  - Please take the pain medications as directed.    Additional Instructions:  - May shower daily.  - Call office or return to ER if you develop any new or worsening pain, new/worsening headache, new visual changes, new lightheadedness or dizziness, new shortness of breath or difficulty breathing or associated chest pain, fever greater than 101, chills, persistent nausea/vomiting.

## 2025-05-27 NOTE — UTILIZATION REVIEW
NOTIFICATION OF ADMISSION DISCHARGE   This is a Notification of Discharge from [facility]. Please be advised that this patient has been discharge from our facility. Below you will find the admission and discharge date and time including the patient’s disposition.   UTILIZATION REVIEW CONTACT:  Utilization Review Assistants  Network Utilization Review Department  Phone: [phone] x carefully listen to the prompts. All voicemails are confidential.  Email: [email or web address]     ADMISSION INFORMATION  PRESENTATION DATE: 5/26/2025  5:00 AM  OBERVATION ADMISSION DATE: N/A  INPATIENT ADMISSION DATE: 5/26/25  9:00 AM   DISCHARGE DATE: 5/26/2025  4:13 PM   DISPOSITION:Home/Self Care    Network Utilization Review Department  ATTENTION: Please call with any questions or concerns to [phone] and carefully listen to the prompts so that you are directed to the right person. All voicemails are confidential.   For Discharge needs, contact Care Management DC Support Team at [phone] opt. 2  Send all requests for admission clinical reviews, approved or denied determinations and any other requests to dedicated fax number below belonging to the campus where the patient is receiving treatment. List of dedicated fax numbers for the Facilities:  FACILITY NAME UR FAX NUMBER   ADMISSION DENIALS (Administrative/Medical Necessity) [phone]   DISCHARGE SUPPORT TEAM ([facility]) [phone]   PARENT CHILD HEALTH (Maternity/NICU/Pediatrics) [phone]   [facility] [phone]   [facility] [phone]   [facility] [phone]   [facility] [phone]   [facility] [phone]   [facility] [phone]   [facility] [phone]   [facility] [phone]   [facility]  [facility] [phone]   Critical access hospital [phone]   [facility] [phone]   [facility] [phone]